# Patient Record
Sex: FEMALE | Race: AMERICAN INDIAN OR ALASKA NATIVE | NOT HISPANIC OR LATINO | URBAN - NONMETROPOLITAN AREA
[De-identification: names, ages, dates, MRNs, and addresses within clinical notes are randomized per-mention and may not be internally consistent; named-entity substitution may affect disease eponyms.]

---

## 2017-09-11 ENCOUNTER — FOLLOW UP ESTABLISHED (OUTPATIENT)
Dept: URBAN - NONMETROPOLITAN AREA CLINIC 16 | Facility: CLINIC | Age: 62
End: 2017-09-11
Payer: COMMERCIAL

## 2017-09-11 DIAGNOSIS — H43.812 VITREOUS DEGENERATION, LEFT EYE: ICD-10-CM

## 2017-09-11 DIAGNOSIS — H52.4 PRESBYOPIA: ICD-10-CM

## 2017-09-11 PROCEDURE — 92015 DETERMINE REFRACTIVE STATE: CPT | Performed by: OPTOMETRIST

## 2017-09-11 PROCEDURE — 92014 COMPRE OPH EXAM EST PT 1/>: CPT | Performed by: OPTOMETRIST

## 2017-09-11 ASSESSMENT — VISUAL ACUITY
OD: 20/20
OS: 20/20

## 2017-09-11 ASSESSMENT — INTRAOCULAR PRESSURE
OD: 16
OS: 16

## 2021-06-25 ENCOUNTER — OFFICE VISIT (OUTPATIENT)
Dept: URBAN - NONMETROPOLITAN AREA CLINIC 15 | Facility: CLINIC | Age: 66
End: 2021-06-25
Payer: COMMERCIAL

## 2021-06-25 DIAGNOSIS — H52.13 MYOPIA, BILATERAL: Primary | ICD-10-CM

## 2021-06-25 DIAGNOSIS — H25.13 AGE-RELATED NUCLEAR CATARACT, BILATERAL: ICD-10-CM

## 2021-06-25 DIAGNOSIS — H16.223 KERATOCONJUNCT SICCA, NOT SPECIFIED AS SJOGREN'S, BILATERAL: ICD-10-CM

## 2021-06-25 PROCEDURE — 92004 COMPRE OPH EXAM NEW PT 1/>: CPT | Performed by: OPTOMETRIST

## 2021-06-25 ASSESSMENT — VISUAL ACUITY
OD: 20/20
OS: 20/20

## 2021-06-25 ASSESSMENT — INTRAOCULAR PRESSURE
OD: 14
OS: 15

## 2021-06-25 NOTE — IMPRESSION/PLAN
Impression: Keratoconjunct sicca, not specified as Sjogren's, bilateral: [id number]. Plan: Start ATs QID OU. Educated pt. on need for regular treatment and F/U.

## 2022-07-06 ENCOUNTER — OFFICE VISIT (OUTPATIENT)
Dept: URBAN - NONMETROPOLITAN AREA CLINIC 14 | Facility: CLINIC | Age: 67
End: 2022-07-06
Payer: COMMERCIAL

## 2022-07-06 DIAGNOSIS — H25.13 AGE-RELATED NUCLEAR CATARACT, BILATERAL: ICD-10-CM

## 2022-07-06 DIAGNOSIS — H52.13 MYOPIA, BILATERAL: Primary | ICD-10-CM

## 2022-07-06 DIAGNOSIS — H16.223 KERATOCONJUNCT SICCA, NOT SPECIFIED AS SJOGREN'S, BILATERAL: ICD-10-CM

## 2022-07-06 PROCEDURE — 92014 COMPRE OPH EXAM EST PT 1/>: CPT | Performed by: OPTOMETRIST

## 2022-07-06 ASSESSMENT — INTRAOCULAR PRESSURE
OS: 15
OD: 14

## 2022-07-06 ASSESSMENT — VISUAL ACUITY
OD: 20/20
OS: 20/20

## 2022-07-06 NOTE — IMPRESSION/PLAN
Impression: Keratoconjunct sicca, not specified as Sjogren's, bilateral: [id number]. Plan: Cont ATs QID OU. Educated pt. on need for regular treatment and F/U.

## 2023-08-04 ENCOUNTER — OFFICE VISIT (OUTPATIENT)
Dept: URBAN - NONMETROPOLITAN AREA CLINIC 14 | Facility: CLINIC | Age: 68
End: 2023-08-04
Payer: COMMERCIAL

## 2023-08-04 DIAGNOSIS — H25.13 AGE-RELATED NUCLEAR CATARACT, BILATERAL: ICD-10-CM

## 2023-08-04 DIAGNOSIS — H16.223 KERATOCONJUNCT SICCA, NOT SPECIFIED AS SJOGREN'S, BILATERAL: ICD-10-CM

## 2023-08-04 DIAGNOSIS — H52.13 MYOPIA, BILATERAL: Primary | ICD-10-CM

## 2023-08-04 PROCEDURE — 92014 COMPRE OPH EXAM EST PT 1/>: CPT | Performed by: OPTOMETRIST

## 2023-08-04 RX ORDER — OMEPRAZOLE 10 MG/1
10 MG CAPSULE, DELAYED RELEASE ORAL
Qty: 0 | Refills: 0 | Status: ACTIVE
Start: 2023-08-04

## 2023-08-04 ASSESSMENT — VISUAL ACUITY
OS: 20/20
OD: 20/20

## 2023-08-04 ASSESSMENT — INTRAOCULAR PRESSURE
OD: 12
OS: 15

## 2025-03-17 ENCOUNTER — OFFICE VISIT (OUTPATIENT)
Dept: URBAN - NONMETROPOLITAN AREA CLINIC 14 | Facility: CLINIC | Age: 70
End: 2025-03-17
Payer: COMMERCIAL

## 2025-03-17 DIAGNOSIS — H25.13 AGE-RELATED NUCLEAR CATARACT, BILATERAL: ICD-10-CM

## 2025-03-17 DIAGNOSIS — H52.13 MYOPIA, BILATERAL: Primary | ICD-10-CM

## 2025-03-17 DIAGNOSIS — H16.223 KERATOCONJUNCT SICCA, NOT SPECIFIED AS SJOGREN'S, BILATERAL: ICD-10-CM

## 2025-03-17 PROCEDURE — 92014 COMPRE OPH EXAM EST PT 1/>: CPT | Performed by: OPTOMETRIST

## 2025-03-17 ASSESSMENT — VISUAL ACUITY
OS: 20/20
OD: 20/20